# Patient Record
Sex: MALE | Race: WHITE | Employment: FULL TIME | URBAN - NONMETROPOLITAN AREA
[De-identification: names, ages, dates, MRNs, and addresses within clinical notes are randomized per-mention and may not be internally consistent; named-entity substitution may affect disease eponyms.]

---

## 2021-04-17 ENCOUNTER — APPOINTMENT (OUTPATIENT)
Dept: GENERAL RADIOLOGY | Age: 31
End: 2021-04-17
Payer: COMMERCIAL

## 2021-04-17 ENCOUNTER — HOSPITAL ENCOUNTER (EMERGENCY)
Age: 31
Discharge: HOME | End: 2021-04-17
Attending: FAMILY MEDICINE
Payer: COMMERCIAL

## 2021-04-17 VITALS
HEART RATE: 94 BPM | WEIGHT: 212.7 LBS | TEMPERATURE: 98.6 F | BODY MASS INDEX: 30.45 KG/M2 | HEIGHT: 70 IN | SYSTOLIC BLOOD PRESSURE: 129 MMHG | DIASTOLIC BLOOD PRESSURE: 77 MMHG | RESPIRATION RATE: 20 BRPM | OXYGEN SATURATION: 99 %

## 2021-04-17 DIAGNOSIS — S52.571A OTHER CLOSED INTRA-ARTICULAR FRACTURE OF DISTAL END OF RIGHT RADIUS, INITIAL ENCOUNTER: Primary | ICD-10-CM

## 2021-04-17 PROCEDURE — 73090 X-RAY EXAM OF FOREARM: CPT

## 2021-04-17 PROCEDURE — 99282 EMERGENCY DEPT VISIT SF MDM: CPT

## 2021-04-17 PROCEDURE — 73110 X-RAY EXAM OF WRIST: CPT

## 2021-04-17 PROCEDURE — 29125 APPL SHORT ARM SPLINT STATIC: CPT

## 2021-04-17 RX ORDER — IBUPROFEN 800 MG/1
800 TABLET ORAL EVERY 6 HOURS PRN
COMMUNITY

## 2021-04-17 ASSESSMENT — PAIN DESCRIPTION - PAIN TYPE: TYPE: ACUTE PAIN

## 2021-04-17 ASSESSMENT — PAIN DESCRIPTION - DESCRIPTORS: DESCRIPTORS: ACHING;BURNING

## 2021-04-17 ASSESSMENT — PAIN DESCRIPTION - ONSET: ONSET: ON-GOING

## 2021-04-17 ASSESSMENT — PAIN SCALES - GENERAL: PAINLEVEL_OUTOF10: 9

## 2021-04-17 ASSESSMENT — PAIN DESCRIPTION - FREQUENCY: FREQUENCY: CONTINUOUS

## 2021-04-17 NOTE — ED NOTES
Patient request not to have his elbow in the splint d/t he needs to be able to move his arm for work. Pt made aware that it is important for him to not use right arm and to keep it iced and elevated.        Shashi Sun RN  04/17/21 1100

## 2021-04-17 NOTE — ED PROVIDER NOTES
eMERGENCY dEPARTMENT eNCOUnter        [facility]    Chief Complaint   Patient presents with    Wrist Injury     right wrist injury from falling off a ladder       HPI    Esme Herrera is a 26 y.o. male who presents with a injury to his right wrist after falling off a ladder yesterday. He had no other injuries. Pain is described as being moderate in severity. Motion makes pain worse. Patient has been taking ibuprofen for pain relief. He has obtained mild pain relief with this. REVIEW OF SYSTEMS      All systems reviewed and positives are in the HPI. PAST MEDICAL HISTORY    History reviewed. No pertinent past medical history. SURGICAL HISTORY    History reviewed. No pertinent surgical history. CURRENT MEDICATIONS    Current Outpatient Rx   Medication Sig Dispense Refill    ibuprofen (ADVIL;MOTRIN) 800 MG tablet Take 800 mg by mouth every 6 hours as needed for Pain         ALLERGIES    Allergies   Allergen Reactions    Sulfa Antibiotics Hives       FAMILY HISTORY    History reviewed. No pertinent family history.     SOCIAL HISTORY    Social History     Socioeconomic History    Marital status:      Spouse name: None    Number of children: None    Years of education: None    Highest education level: None   Occupational History    None   Social Needs    Financial resource strain: None    Food insecurity     Worry: None     Inability: None    Transportation needs     Medical: None     Non-medical: None   Tobacco Use    Smoking status: Current Every Day Smoker    Smokeless tobacco: Never Used   Substance and Sexual Activity    Alcohol use: None    Drug use: None    Sexual activity: None   Lifestyle    Physical activity     Days per week: None     Minutes per session: None    Stress: None   Relationships    Social connections     Talks on phone: None     Gets together: None     Attends Mosque service: None     Active member of club or organization: None     Attends meetings of clubs or organizations: None     Relationship status: None    Intimate partner violence     Fear of current or ex partner: None     Emotionally abused: None     Physically abused: None     Forced sexual activity: None   Other Topics Concern    None   Social History Narrative    None       PHYSICAL EXAM    VITAL SIGNS: /77   Pulse 94   Temp 98.6 °F (37 °C) (Oral)   Resp 20   Ht 5' 10\" (1.778 m)   Wt 212 lb 11.2 oz (96.5 kg)   SpO2 99%   BMI 30.52 kg/m²   Constitutional:  Well developed, well nourished, moderate acute distress, non-toxic appearance   HENT:  Atraumatic, external ears normal, nose normal, oropharynx moist.  Neck- normal range of motion, no tenderness, supple   Respiratory:  No respiratory distress, normal breath sounds. Cardiovascular:  Normal rate, normal rhythm, no murmurs, no gallops, no rubs   GI:  Soft, nondistended, normal bowel sounds, nontender   Musculoskeletal: Tenderness and swelling right wrist area. Tenderness is over distal radius. Decreased range of motion due to pain. Pulses 2+ and equal bilaterally. Integument:  Well hydrated, no rash   Neurologic: Grossly intact    RADIOLOGY/PROCEDURES    X-rays revealed acute nondisplaced distal right radial fracture at the level of the epiphysis with subtle undulation of the articular surface without displacement. There is a large amount of associated soft tissue swelling. Narrowing of the lateral aspect of the radiocarpal joint space of uncertain chronicity. ED COURSE & MEDICAL DECISION MAKING    Pertinent Labs & Imaging studies reviewed. (See chart for details)  I initially ordered a sugar tong splint. Patient requested that his elbow not be immobilized. Thus, splint was adjusted for this. Patient will follow up with Dr. Dixon Severin. He was stable on discharge. FINAL IMPRESSION    1. Fracture right distal radius  2. Summation      Patient Course: Patient was stable on discharge.   Splint was applied in the ER. ED Medications administered this visit:  Medications - No data to display    New Prescriptions from this visit:    New Prescriptions    No medications on file       Follow-up:  Nat Ho MD  [address] [facility],  [phone]    Call in 2 days          Final Impression:   1.  Other closed intra-articular fracture of distal end of right radius, initial encounter               (Please note that portions of this note were completed with a voice recognition program.  Efforts were made to edit the dictations but occasionally words are mis-transcribed.)     Rienier Zarate MD  04/17/21 3992

## 2021-05-12 ENCOUNTER — HOSPITAL ENCOUNTER (OUTPATIENT)
Age: 31
Discharge: HOME | End: 2021-05-14
Payer: COMMERCIAL

## 2021-05-12 ENCOUNTER — HOSPITAL ENCOUNTER (OUTPATIENT)
Dept: GENERAL RADIOLOGY | Age: 31
Discharge: HOME | End: 2021-05-14
Payer: COMMERCIAL

## 2021-05-12 DIAGNOSIS — S62.101A CLOSED FRACTURE OF RIGHT WRIST, INITIAL ENCOUNTER: ICD-10-CM

## 2021-05-12 PROCEDURE — 73110 X-RAY EXAM OF WRIST: CPT
